# Patient Record
Sex: FEMALE | Race: WHITE | NOT HISPANIC OR LATINO | Employment: FULL TIME | URBAN - METROPOLITAN AREA
[De-identification: names, ages, dates, MRNs, and addresses within clinical notes are randomized per-mention and may not be internally consistent; named-entity substitution may affect disease eponyms.]

---

## 2024-05-06 ENCOUNTER — OFFICE VISIT (OUTPATIENT)
Dept: OBSTETRICS AND GYNECOLOGY | Facility: CLINIC | Age: 34
End: 2024-05-06
Payer: COMMERCIAL

## 2024-05-06 VITALS
HEIGHT: 65 IN | WEIGHT: 131.7 LBS | SYSTOLIC BLOOD PRESSURE: 114 MMHG | BODY MASS INDEX: 21.94 KG/M2 | OXYGEN SATURATION: 98 % | HEART RATE: 76 BPM | DIASTOLIC BLOOD PRESSURE: 72 MMHG

## 2024-05-06 DIAGNOSIS — Z90.710 HISTORY OF HYSTERECTOMY FOR BENIGN DISEASE: ICD-10-CM

## 2024-05-06 DIAGNOSIS — N39.41 SENSORY URGE INCONTINENCE: ICD-10-CM

## 2024-05-06 DIAGNOSIS — R33.9 URINARY RETENTION: ICD-10-CM

## 2024-05-06 DIAGNOSIS — Z01.419 WELL WOMAN EXAM WITH ROUTINE GYNECOLOGICAL EXAM: Primary | ICD-10-CM

## 2024-05-06 PROCEDURE — 99395 PREV VISIT EST AGE 18-39: CPT | Performed by: NURSE PRACTITIONER

## 2024-05-06 RX ORDER — FLUOXETINE HYDROCHLORIDE 20 MG/1
20 CAPSULE ORAL DAILY
COMMUNITY

## 2024-05-06 NOTE — PROGRESS NOTES
HPI: Mary De La Fuente is a 34 y.o. year old  presenting for annual gyn exam    Past pt of Dr. aGle, seen for PMH of endometriosis and pelvic myalgia. She is s/p TLH/BS, benign pathology    Current concerns: having difficulty sensing when having to void, until very full and then has urgency and possibly incontinence. Then will have to sit for 5-10min on toilet just to empty. Sometimes won't be able to void and often doesn't empty completely.    Pt has 6th grader daughter, works as  at [employer].    LMP:  No LMP recorded. Patient has had a hysterectomy.  Sexually active: yes  STI concerns: no  Contraception: hysterectomy   Last mammogram: never  Last pap: 2021 normal, negative HPV  History of abnormal pap: yes, cervical cancer cells, had LEEP age 14yo at that time, then again in her 20's; paps and HPV always normal since 20s  Other gyn history: see above, SAB x1, C/s x1   OB History        2    Para   1    Term                AB   1    Living   1       SAB   1    IAB        Ectopic        Multiple        Live Births   1              Past Medical History:  2019: Personal history of other diseases of the female genital   tract      Comment:  History of abnormal uterine bleeding   Past Surgical History:  2019: OTHER SURGICAL HISTORY      Comment:  Laparoscopy  2022: OTHER SURGICAL HISTORY      Comment:  Laparoscopic hysterectomy   Social History    Socioeconomic History      Marital status: Single      Spouse name: Not on file      Number of children: Not on file      Years of education: Not on file      Highest education level: Not on file    Occupational History      Not on file    Tobacco Use      Smoking status: Some Days        Types: Cigarettes      Smokeless tobacco: Never    Substance and Sexual Activity      Alcohol use: Yes        Alcohol/week: 1.0 standard drink of alcohol        Types: 1 Standard drinks or equivalent per week      Drug use: Never      Sexual  "activity: Not on file    Other Topics      Concerns:        Not on file    Social History Narrative      Not on file    Social Determinants of Health  Financial Resource Strain: Not on file  Food Insecurity: Not on file  Transportation Needs: Not on file  Physical Activity: Not on file  Stress: Not on file  Social Connections: Not on file  Intimate Partner Violence: Not on file  Housing Stability: Not on file   Review of patient's family history indicates:  Problem: Mitral valve prolapse      Relation: Mother          Name:               Age of Onset: (Not Specified)  Problem: Rheum arthritis      Relation: Mother          Name:               Age of Onset: (Not Specified)  Problem: Osteoporosis      Relation: Mother          Name:               Age of Onset: (Not Specified)  Problem: Diabetes      Relation: Father          Name:               Age of Onset: (Not Specified)  Problem: Skin cancer      Relation: Brother          Name:               Age of Onset: (Not Specified)  Problem: Bone cancer      Relation: Maternal Grandmother          Name:               Age of Onset: (Not Specified)  Problem: Skin cancer      Relation: Maternal Grandfather          Name:               Age of Onset: (Not Specified)  Problem: Brain Aneurysm      Relation: Paternal Grandfather          Name:               Age of Onset: (Not Specified)     Current Outpatient Medications:   FLUoxetine (PROzac) 20 mg capsule, Take 1 capsule (20 mg) by mouth once daily., Disp: , Rfl:           REVIEW OF SYSTEMS:  Breast. denies masses, nipple discharge  : denies dysuria, hematuria; rare urge incontinence and decreased sensation of urge to go, having to sit and wait for emptying at time.s  Gl: denies change in bowel habits, blood in stools      PHYSICAL EXAM:  Vitals: /72   Pulse 76   Ht 1.651 m (5' 5\")   Wt 59.7 kg (131 lb 11.2 oz)   SpO2 98%   BMI 21.92 kg/m²   Gen: well appearing woman in NAD  HEENT: normocephalic, thyroid not " enlarged, no lymphadenopathy  CV: no m/r/g  Chest: CTAB, no w/r/r  Breast: normal tissue bilaterally without masses, skin changes, lymphadenopathy   Abdomen: soft, nontender, no masses/hernias, liver and spleen not enlarged   Pelvic:  - external genitalia: normal  - vagina: normal, normal discharge  - cervix: surgically absent  - uterus: surgically absent  - adnexa: no palpable masses or tenderness  RECTAL: no external hemorrhoids; rectal exam deferred    ASSESSMENT/PLAN :    1) Health maintenance, Well-woman exam.  Pap/HPV up to date, and no longer needed d/t benign path  Mammograms yearly starting age 37yo  Nutrition, exercise and routine health maintenance exams reviewed.  Calcium/Vitamin D supplementation information provided   Smoking cessation: at work only, social  2) Contraception: hysterectomy.   3) STD screening: declines, no concerns  4) Urine sensation decreased, possible retention and urge incontinence at times. Refer to UroGyn  Follow up one year or sooner as needed

## 2024-05-13 ENCOUNTER — OFFICE VISIT (OUTPATIENT)
Dept: OBSTETRICS AND GYNECOLOGY | Facility: CLINIC | Age: 34
End: 2024-05-13
Payer: COMMERCIAL

## 2024-05-13 VITALS
DIASTOLIC BLOOD PRESSURE: 68 MMHG | HEIGHT: 65 IN | BODY MASS INDEX: 21.66 KG/M2 | SYSTOLIC BLOOD PRESSURE: 118 MMHG | WEIGHT: 130 LBS

## 2024-05-13 DIAGNOSIS — K59.04 CHRONIC IDIOPATHIC CONSTIPATION: Primary | ICD-10-CM

## 2024-05-13 DIAGNOSIS — N39.41 SENSORY URGE INCONTINENCE: ICD-10-CM

## 2024-05-13 DIAGNOSIS — R33.9 URINARY RETENTION: ICD-10-CM

## 2024-05-13 DIAGNOSIS — R33.9 INCOMPLETE BLADDER EMPTYING: ICD-10-CM

## 2024-05-13 DIAGNOSIS — Z90.710 HISTORY OF HYSTERECTOMY FOR BENIGN DISEASE: ICD-10-CM

## 2024-05-13 PROCEDURE — 99214 OFFICE O/P EST MOD 30 MIN: CPT | Performed by: NURSE PRACTITIONER

## 2024-05-13 NOTE — PROGRESS NOTES
Mary De La Fuente is a 34 y.o. referred by Nichole Lee for suspected incomplete emptying.     Chief Complaint: difficulty emptying bladder,  severe cramping with full bladder (upon waking), AM frequency, stress incontinence on full bladder or if she does not fully empty    OB/GYN History:   - , c/s x 1  - GYN surgeries: hyst  for endo, adenomyosis  - Sexually active with male partner, no pain with intercourse    Prolapse symptoms:   - reports prolapse symptoms, feels like sitting on something but this is a rare sensation  - reports sense of incomplete emptying, she will spend extra few minutes on toilet to get all urine out    Urinary symptoms:   - CITLALI: present and daily, wears pads on occasion for urine loss  - UUI: present but minimally bothersome  - Frequency most bothersome in the morning  - Nocturia: denies  - 0 culture proven UTIs in past year   - Excessive fluid intake Yes - water and soda, upwards 1-2 gallons/day. 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    Bowels  - BMs daily, reports mostly constipation with sense of incomplete bowel emptying    Health Maintenance  - Mammogram: never  - Colonoscopy: never    Past medical and surgical hx reviewed     Physical Exam  Physical Exam  Constitutional:       Appearance: Normal appearance.   Genitourinary:      Vulva, bladder, rectum and urethral meatus normal.      Genitourinary Comments: Pelvic floor grossly tender to palpation L>R      No vaginal discharge.      No vaginal prolapse present.     No vaginal atrophy present.       Right Adnexa: not tender, not full and no mass present.     Left Adnexa: not tender, not full and no mass present.     Uterus is not enlarged, fixed or tender.      Levator ani is tender.   Breasts:     Right: Normal.      Left: Normal.   Pulmonary:      Effort: Pulmonary effort is normal.   Neurological:      General: No focal deficit present.      Mental Status: She is alert and oriented to person, place, and time.    Psychiatric:         Mood and Affect: Mood normal.         Behavior: Behavior normal.         Thought Content: Thought content normal.         Judgment: Judgment normal.   Vitals reviewed.     PVR ** ml by **    Assessment and Plan    Mary De La Fuente is an 34 y.o. being treated for incomplete emptying, stress incontinence, constipation and pelvic floor dysfunction.    Incomplete emptying wih valsalva voiding, pelvic floor dysfunction  PVR 15 ml by ultrasound  Given her symptoms of cramping with full bladder and valsalva voiding/ extended time to void, I do believe this is pelvic floor in origin with difficulty relaxing pelvic floor muscles  She has significant surgical and endo history which could contribute to pelvic floor dysfunction  She has gone to PFPT in the past, but this was immediately pre-COVID, so she was not able to continue in therapy  She is willing to return to PFPT and accepted referral and list of providers today  CITLALI  Present and daily occurrence  Discussed options for treatment including: PFPT, pessary and surgical procedures  She will proceed with PFPT as above  Constipation  Discussed correlation between constipation and bladder symptoms   Recommended starting daily fiber supplement x 2 weeks, if stool is still difficult to pass pt instructed to start miralax once daily in addition to the fiber  Start fiber then miralax if fiber is insuffienct     Follow up 3-4 months after beginning PFPT

## 2024-06-04 NOTE — PROGRESS NOTES
PELVIC FLOOR PHYSICAL THERAPY EVALUATION AND TREATMENT    Patient Name: Mary De La Fuente  MRN: [id number]  Today's Date: 6/5/2024  Time Calculation  Start Time: 1440  Stop Time: 1539  Time Calculation (min): 59 min    INSURANCE:  Visit number: 1  Payor: LUCRETIA / Plan: LUCRETIA HMP / Product Type: *No Product type* /   EVAL $261.00 //**PT EVAL AUTH 6/5-6/19/** 20V PCY 0 USED NEEDS AUTH REMINGTON PAYS AT 80% AFTER DED   Referred by: Mago Mojica A*     PT Evaluation Time Entry  PT Evaluation (Low) Time Entry: 30  PT Therapeutic Procedures Time Entry  Therapeutic Activity Time Entry: 24              Non-Billable Time  Non-billable time: 5  Non-billable time reason: Pt changing clothes      ASSESSMENT:  Mary De La Fuente  is a 34 y.o. old patient who participated in a pelvic floor physical therapy evaluation today due to difficulty emptying bladder, pelvic pain, and urinary leakage. Pt presents with signs and symptoms consistent with overactive pelvic floor and stress urinary incontinence (CITLALI). Her impairments include: reduced bladder sensation, hesitancy, slow stream, straining to void, feeling of incomplete emptying, urinary retention, and difficulty relaxing pelvic floor muscles. Due to these impairments, she has the following functional limitations and participation restrictions: wearing desired clothing, sexual activities, and increased risk of developing UTIs. Mary De La Fuente 's clinical presentation is stable and/or uncomplicated characteristics with the level of complexity being low.  her potential for rehab is good. Skilled physical therapy services are appropriate and beneficial in order to achieve measurable and meaningful change in stated objective tests and measures. Utilization of skilled physical therapy services will aid in advancing her functional status and attaining her therapy-related goals. The patient verbalized understanding and is in agreement with all goals and plan of care. Plan of care was developed  "with input and agreement by the patient    PT Assessment Results: Decreased strength, Pain  Rehab Prognosis: Good    PLAN: [place]: [facility] for pelvic floor down training & objective data; assess response to dilators  Treatment/Interventions: Biofeedback, Cryotherapy, Dry needling, Education/ Instruction, Electrical stimulation, Gait training, Hot pack, Manual therapy, Neuromuscular re-education, Self care/ home management, Taping techniques, Therapeutic activities, Therapeutic exercises, Ultrasound, Work conditioning  PT Plan: Skilled PT  PT Frequency:  (Every other week)  Duration: 10 weeks (5V)  Rehab Potential: Good  Plan of Care Agreement: Patient        THERAPY DIAGNOSIS:  1. Pelvic pain        2. Sensory urge incontinence  Referral to Physical Therapy    Follow Up In Physical Therapy      3. Incomplete bladder emptying  Referral to Physical Therapy    Follow Up In Physical Therapy      4. Pelvic floor tension            SUBJECTIVE:  Pelvic History  Chief Issue/Description of Symptoms: difficulty emptying bladder, severe cramping with full bladder, increased urinary frequency in AM, and urinary leakage with stairs/coughing/laughing/sneezing. Began post-hysterectomy in 2021. Reports using restroom every 20-30 mins at home despite not having urge sensation- attributes this to feeling more comfortable in environment.   Onset of Symptoms: December 17, 2021  Functional limitations: wearing desired clothing & increased risk of developing UTIs  Home Environment: Lives with mom, dad, and daughter in single-level home  Occupation: Full time -   Hobbies: watching TV    Pain location: bladder  Pain description: bloating & cramping sensations  Pain rating: 3/10 current; 7-8/10 at worst  Sx worse with: full bladder & stress  Sx better with: heating pad  Prior treatments/interventions: none since hysterectomy; participated in PFPT prior to hysterectomy  Pt stated goals: \"reduce pain\"    Pelvic " Pain  Description: bloating & cramping  Location: bladder  Duration: constant  Frequency: daily  Since onset, the symptoms are: same  Pain when emptying bladder: yes  Pain with wiping or tight clothing: no  Pain with intercourse: yes  History of back pain: yes    Bladder  Excessive Urinary Urgency: yes  Daytime Voiding Frequency: 3-4x  Nighttime Voiding Frequency: 0x  Unintentional urine loss frequency: daily  Leakage occurs with: coughing, laughing, sneezing, stairs   Leakage amount: small-medium  Difficulty initiating flow of urine: yes  Slow/weak urine stream: yes  Difficulty starting urine stream/push to urinate: yes  Able to completely empty bladder: no  Tests performed by doctor: none  Frequent UTI's: yes    Bowel Screen  BM frequency: 1x daily  Frequent diarrhea: no  Frequent constipation/straining/incomplete emptying: no  Excessive Bowel Urgency: no    Exercise  Current exercise regime: active at job & playing with daughter  Do you do Kegels? yes       MEDICAL HISTORY FORM:  Reviewed (scanned into chart)  Denies unexpected weight loss/gain, night sweats, or night pain      PRECAUTIONS:  Fall risk: none   (+) hx of endometriosis, adenomyosis, PCOS, HPV, UTIs, kidney stones  Denies CA, pacemaker, DM, HTN, blood thinner medication use, latex allergy, epilepsy/seizures, or other known cardio/neuro/pulmonary problems   Previous surgeries include: hysterectomy 2021      OBJECTIVE  *All internal examination performed with informed consent*    Voluntary Pelvic Floor Contraction  weak    Voluntary Pelvic Floor Relaxation  partial    Pelvic Floor MMT Grade  2/poor: squeeze pressure asymmetrical or felt at various points- no lift or displacement    L[place]cock PERF(Power/Endurance/Repetitions/Fast Twitch)  2/4/5/2    Pain: TTP throughout pelvic floor muscle layers 2 & 3 (R>L side); pain with insertion    Pelvic Dermatome Screen: intact    LE MMT: L and R  Hip flex: 4+/5 and 4+/5  Hip ext: NT  Hip ABD: 4/5 and 4/5  Hip ADD:  "4/5 and 4/5  Knee ext: 4+/5 and 4+/5  Knee flex: 4+/5 and 4+/5  Ankle DF: 5/5 and 5/5  Ankle PF: 5/5 and 5/5      OUTCOME MEASURES  Other Measures  Other Outcome Measures: Female NIH-CPSI: 27      TREATMENTS:  Therapeutic Activity x 24 minutes, 1 unit   Education provided on the following concepts  Different dilator brands/types and associated benefits   Juliana Daniels: vibration to promote muscle relaxation  Intimate carlin: excellent brand but jump from size 1 to size 2 is quite large  V well: cheaper option  Reviewed guide to using dilators ( handout provided)  OHNUT: reduce pain with penetration during intercourse  Education on pelvic floor hypertonicity vs true muscle weakness using \"the spring analogy\". Tightly coiled spring has difficulty absorbing shock or meek further upon attempt. If pelvic floor muscles have excessive tension, they are unable to perform appropriate contraction. This may result in a low pelvic floor MMT grade despite no true strength deficit. In this instance, pelvic floor muscles must be down-trained to relax. Once pelvic floor muscles achieve the appropriate tone, true MMT grade can be determined.   Pelvic floor hypertonicity role in causing difficulty relaxing muscles to urinate, pain with intercourse & medical exams, excessive pressure around urethra causing leakage  Instruction to stop doing reps of kegels: will make pelvic floor hypertonicity worse      EDUCATION:  See above        GOALS:   STG (to be achieved in 3 weeks)  Mary De La Fuente will independently perform HEP as assigned to promote self-management of symptoms and continue making functional gains outside of physical therapy.     LTG (to be achieved by discharge)  Mary De La Fuente will decrease NIH-CPSI raw score by at least 6 points (MCID) to demonstrate improved quality of life.  Mary De La Fuente will report at least 50% reduction in pelvic pain with intercourse.   Mary De La Fuente will report minimal to no leakage with coughing, " laughing, sneezing, or stairs.   Mary De La Fuente will improve resting tone of pelvic floor to within norm of 2-3 microvolts to assist with emptying bladder and controlling pelvic pain.

## 2024-06-05 ENCOUNTER — EVALUATION (OUTPATIENT)
Dept: PHYSICAL THERAPY | Facility: CLINIC | Age: 34
End: 2024-06-05
Payer: COMMERCIAL

## 2024-06-05 DIAGNOSIS — R10.2 PELVIC PAIN: Primary | ICD-10-CM

## 2024-06-05 DIAGNOSIS — M62.89 PELVIC FLOOR TENSION: ICD-10-CM

## 2024-06-05 DIAGNOSIS — N39.41 SENSORY URGE INCONTINENCE: ICD-10-CM

## 2024-06-05 DIAGNOSIS — R33.9 INCOMPLETE BLADDER EMPTYING: ICD-10-CM

## 2024-06-05 PROCEDURE — 97161 PT EVAL LOW COMPLEX 20 MIN: CPT | Mod: GP

## 2024-06-05 PROCEDURE — 97530 THERAPEUTIC ACTIVITIES: CPT | Mod: GP

## 2024-06-05 SDOH — ECONOMIC STABILITY: FOOD INSECURITY: WITHIN THE PAST 12 MONTHS, THE FOOD YOU BOUGHT JUST DIDN'T LAST AND YOU DIDN'T HAVE MONEY TO GET MORE.: NEVER TRUE

## 2024-06-05 SDOH — ECONOMIC STABILITY: FOOD INSECURITY: WITHIN THE PAST 12 MONTHS, YOU WORRIED THAT YOUR FOOD WOULD RUN OUT BEFORE YOU GOT MONEY TO BUY MORE.: NEVER TRUE

## 2024-06-05 ASSESSMENT — COLUMBIA-SUICIDE SEVERITY RATING SCALE - C-SSRS
6. HAVE YOU EVER DONE ANYTHING, STARTED TO DO ANYTHING, OR PREPARED TO DO ANYTHING TO END YOUR LIFE?: NO
1. IN THE PAST MONTH, HAVE YOU WISHED YOU WERE DEAD OR WISHED YOU COULD GO TO SLEEP AND NOT WAKE UP?: NO
2. HAVE YOU ACTUALLY HAD ANY THOUGHTS OF KILLING YOURSELF?: NO

## 2024-06-05 ASSESSMENT — ENCOUNTER SYMPTOMS
LOSS OF SENSATION IN FEET: 0
DEPRESSION: 0
OCCASIONAL FEELINGS OF UNSTEADINESS: 0

## 2024-06-05 ASSESSMENT — PATIENT HEALTH QUESTIONNAIRE - PHQ9
2. FEELING DOWN, DEPRESSED OR HOPELESS: NOT AT ALL
SUM OF ALL RESPONSES TO PHQ9 QUESTIONS 1 AND 2: 0
1. LITTLE INTEREST OR PLEASURE IN DOING THINGS: NOT AT ALL

## 2024-06-24 ENCOUNTER — APPOINTMENT (OUTPATIENT)
Dept: PHYSICAL THERAPY | Facility: CLINIC | Age: 34
End: 2024-06-24
Payer: COMMERCIAL

## 2024-06-25 NOTE — PROGRESS NOTES
PELVIC FLOOR PHYSICAL THERAPY TREATMENT NOTE    Patient Name:  Mary De La Fuente   Patient MRN: [id number]  Date: 06/27/24  Time Calculation  Start Time: 0918  Stop Time: 1005  Time Calculation (min): 47 min    Insurance:  Visit number: 2 (updated 6/27/2024)   Payor: LUCRETIA / Plan: [employer]CR[employer]IA HMP / Product Type: *No Product type* /   EVAL $261.00 //**PT EVAL AUTH 6/5-6/19/** 20V PCY 0 USED NEEDS AUTH REMINGTON PAYS AT 80% AFTER DED   Referred by: Mago Mojica A*        PT Therapeutic Procedures Time Entry  Neuromuscular Re-Education Time Entry: 34  Therapeutic Activity Time Entry: 10              Non-Billable Time  Non-billable time: 3  Non-billable time reason: Pt changing clothes      Assessment:  Progress towards functional goals: able to tolerate/progress vaginal dilator size for internal stretching  Response to interventions: Patient left session with all questions answered and no increase in sx. Patient verbalized improved understanding of alternating internal vs external pelvic floor stretching.   Justification for continued skilled care: reduce pelvic pain; promote pelvic floor relaxation to assist with emptying bladder  Collected objective data using Presentigo machine with informed pt consent. Baseline pelvic floor resting tone elevated (4.8 microvolts) vs norm (2-3 microvolts). Pt agreeable to use of ESTIM for pelvic floor down-training and educated that pain may temporarily increase post-performance, verbalized understanding. Reviewed contraindications of ESTIM prior to use: Mary De La Fuente denied pacemaker, arrhythmias, implanted stimulators, thrombophlebitis, pregnancy (if applicable), or malignant tumor. Also reviewed precautions: she denied/reported cardiac disease, impaired sensation, skin irritation/wounds. Instructed pt through diaphragmatic breathing to assist with reducing pelvic floor tone. Post-assessment revealed elevated resting tone compared to baseline, pt denied pain. Instructed pt to monitor sx  "and report back next appointment. Reduced dilator use to every other day alternating with external pelvic floor stretching due to subjective report of increased cramping post-performance.         Plan:  pelvic floor down-training    Therapy diagnoses:   1. Pelvic pain        2. Sensory urge incontinence  Follow Up In Physical Therapy      3. Incomplete bladder emptying  Follow Up In Physical Therapy      4. Pelvic floor tension             Subjective:  Mary reports she feels like her condition is neither improving nor worsening. Purchased dilators: initially unable to fully insert size 1, however, after 2 weeks she is now able. Consistently experiences abdominal cramping after dilator use. No change in pain or bladder so far.   Pain Location: bladder  Pain (0-10): 4   HEP adherence / understanding: compliance with the instructed home exercises.    Precautions:  Fall risk: none   (+) hx of endometriosis, adenomyosis, PCOS, HPV, UTIs, kidney stones  Denies CA, pacemaker, DM, HTN, blood thinner medication use, latex allergy, epilepsy/seizures, or other known cardio/neuro/pulmonary problems   Previous surgeries include: hysterectomy 2021      Objective:  Avg resting tone pre-ESTIM: 4.8 microvolts  Avg resting tone post-ESTIM: 5.8 mircovolts      Treatment Performed: (\"NP\" = Not Performed)   Therapeutic Activities:  Instruction to reduce dilator use to every other day due to abdominal cramping  Verbal review of assigned HEP for external PF stretching (to be performed on alternate days of dilator)  Assembla access code [id number]  Supine Pelvic Floor Stretch  - 1 x daily - 7 x weekly - 1 sets - 3-4 reps - 30 sec hold  Deep Squat with Pelvic Floor Relaxation  - 1 x daily - 7 x weekly - 1 sets - 3-4 reps - 30 sec hold  Diaphragmatic Breathing in Supported Child's Pose with Pelvic Floor Relaxation  - 1 x daily - 7 x weekly - 1 sets - 1 reps - 2-5 min hold  Neuromuscular Re-Education:   Pre-ESTIM: baseline assessment for avg " resting tone (see objective)  ESTIM for pelvic floor down-training: verbal instruction for diaphragmatic breathing EC  Prometheus internal rectal sensor used with pt in hooklying position  Selected parameters: 12.5 Hz, 5 sec on: 5 sec off 15 min  Intensity 32  Post-ESTIM: assessment for avg resting tone and min resting tone (see objective)      NP this session  Therapeutic Exercise:  Gait Training:  Manual Therapy:  Modalities:       Education: Answered questions about home exercise program. Provided verbal feedback to improve exercise technique

## 2024-06-27 ENCOUNTER — TREATMENT (OUTPATIENT)
Dept: PHYSICAL THERAPY | Facility: CLINIC | Age: 34
End: 2024-06-27
Payer: COMMERCIAL

## 2024-06-27 DIAGNOSIS — N39.41 SENSORY URGE INCONTINENCE: ICD-10-CM

## 2024-06-27 DIAGNOSIS — R10.2 PELVIC PAIN: Primary | ICD-10-CM

## 2024-06-27 DIAGNOSIS — M62.89 PELVIC FLOOR TENSION: ICD-10-CM

## 2024-06-27 DIAGNOSIS — R33.9 INCOMPLETE BLADDER EMPTYING: ICD-10-CM

## 2024-06-27 PROCEDURE — 97112 NEUROMUSCULAR REEDUCATION: CPT | Mod: GP

## 2024-06-27 PROCEDURE — 97530 THERAPEUTIC ACTIVITIES: CPT | Mod: GP

## 2024-07-08 NOTE — PROGRESS NOTES
PELVIC FLOOR PHYSICAL THERAPY TREATMENT NOTE    Patient Name:  Mary De La Fuente   Patient MRN: [id number]  Date: 07/08/24       Insurance:  Visit number: Visit count could not be calculated. Make sure you are using a visit which is associated with an episode. (updated 7/9/2024)   Payor: [employer] / Plan: [employer] [facility] / Product Type: *No Product type* /   EVAL $261.00 //**PT EVAL AUTH 6/5-6/19/** 20V PCY 0 USED NEEDS AUTH REMINGTON PAYS AT 80% AFTER DED   Referred by: Mago Mojica A*                              Assessment:  Progress towards functional goals: able to tolerate/progress vaginal dilator size for internal stretching  Response to interventions: Patient left session with all questions answered and no increase in sx. Patient verbalized improved understanding of alternating internal vs external pelvic floor stretching.   Justification for continued skilled care: reduce pelvic pain; promote pelvic floor relaxation to assist with emptying bladder  Collected objective data using magnetic.io machine with informed pt consent. Baseline pelvic floor resting tone elevated (4.8 microvolts) vs norm (2-3 microvolts). Pt agreeable to use of ESTIM for pelvic floor down-training and educated that pain may temporarily increase post-performance, verbalized understanding. Reviewed contraindications of ESTIM prior to use: Mary De La Fuente denied pacemaker, arrhythmias, implanted stimulators, thrombophlebitis, pregnancy (if applicable), or malignant tumor. Also reviewed precautions: she denied/reported cardiac disease, impaired sensation, skin irritation/wounds. Instructed pt through diaphragmatic breathing to assist with reducing pelvic floor tone. Post-assessment revealed elevated resting tone compared to baseline, pt denied pain. Instructed pt to monitor sx and report back next appointment. Reduced dilator use to every other day alternating with external pelvic floor stretching due to subjective report of increased cramping  "post-performance.         Plan:  pelvic floor down-training    Therapy diagnoses:   No diagnosis found.       Subjective:  Mary reports she feels like her condition is neither improving nor worsening. Purchased dilators: initially unable to fully insert size 1, however, after 2 weeks she is now able. Consistently experiences abdominal cramping after dilator use. No change in pain or bladder so far.   Pain Location: bladder  Pain (0-10): 4   HEP adherence / understanding: compliance with the instructed home exercises.    Precautions:  Fall risk: none   (+) hx of endometriosis, adenomyosis, PCOS, HPV, UTIs, kidney stones  Denies CA, pacemaker, DM, HTN, blood thinner medication use, latex allergy, epilepsy/seizures, or other known cardio/neuro/pulmonary problems   Previous surgeries include: hysterectomy 2021      Objective:  Avg resting tone pre-ESTIM: 4.8 microvolts  Avg resting tone post-ESTIM: 5.8 mircovolts      Treatment Performed: (\"NP\" = Not Performed)   Therapeutic Activities:  Instruction to reduce dilator use to every other day due to abdominal cramping  Verbal review of assigned HEP for external PF stretching (to be performed on alternate days of dilator)  [employer] access code [email or web address]  Supine Pelvic Floor Stretch  - 1 x daily - 7 x weekly - 1 sets - 3-4 reps - 30 sec hold  Deep Squat with Pelvic Floor Relaxation  - 1 x daily - 7 x weekly - 1 sets - 3-4 reps - 30 sec hold  Diaphragmatic Breathing in Supported Child's Pose with Pelvic Floor Relaxation  - 1 x daily - 7 x weekly - 1 sets - 1 reps - 2-5 min hold  Neuromuscular Re-Education:   Pre-ESTIM: baseline assessment for avg resting tone (see objective)  ESTIM for pelvic floor down-training: verbal instruction for diaphragmatic breathing EC  Prometheus internal rectal sensor used with pt in hooklying position  Selected parameters: 12.5 Hz, 5 sec on: 5 sec off 15 min  Intensity 32  Post-ESTIM: assessment for avg resting tone and min resting tone (see " objective)      NP this session  Therapeutic Exercise:  Gait Training:  Manual Therapy:  Modalities:       Education: Answered questions about home exercise program. Provided verbal feedback to improve exercise technique

## 2024-07-09 ENCOUNTER — APPOINTMENT (OUTPATIENT)
Dept: PHYSICAL THERAPY | Facility: CLINIC | Age: 34
End: 2024-07-09
Payer: COMMERCIAL

## 2024-07-09 ENCOUNTER — DOCUMENTATION (OUTPATIENT)
Dept: PHYSICAL THERAPY | Facility: CLINIC | Age: 34
End: 2024-07-09
Payer: COMMERCIAL

## 2024-07-09 NOTE — PROGRESS NOTES
Physical Therapy                 Therapy Communication Note    Patient Name: Mary De La Fuente  MRN: [id number]  Today's Date: 7/9/2024     Discipline: Physical Therapy    Comment: Patient cx via MyChart

## 2024-07-10 NOTE — PROGRESS NOTES
PELVIC FLOOR PHYSICAL THERAPY TREATMENT NOTE    Patient Name:  Mary De La Fuente   Patient MRN: [id number]  Date: 07/11/24  Time Calculation  Start Time: 1045  Stop Time: 1130  Time Calculation (min): 45 min    Insurance:  Visit number: 3 (updated 7/11/2024)   Payor: [employer] / Plan: [employer] [facility] / Product Type: *No Product type* /   EVAL $261.00 //**PT EVAL AUTH 6/5-6/19/** 20V PCY 0 USED NEEDS AUTH MIGUEL [employer]NGELLOTIFFANY PAYS AT 80% AFTER DED   Referred by: Mago Mojica A*        PT Therapeutic Procedures Time Entry  Neuromuscular Re-Education Time Entry: 39  Therapeutic Activity Time Entry: 6                     Assessment:  Progress towards functional goals: able to tolerate/progress vaginal dilator size for internal stretching & reporting more consecutive void durations  Response to interventions: Patient left session with all questions answered and no increase in sx.   Justification for continued skilled care: reduce pelvic pain; promote pelvic floor relaxation to assist with emptying bladder  Pt agreeable to using [employer] machine to assess baseline resting tone of pelvic floor musculature: WNL indicating successful carryover of internal and external pelvic floor stretching. Functionally, pt has been able to void for longer durations vs multiple voids. Pt selected option of ESTIM for pelvic floor down-training- reports discomfort was mild following last session and she only works a half day today. Similar to last session, post-assessment revealed mildly elevated resting tone compared to baseline- pt denied pain. Instructed pt to monitor sx. Additional education provided on bladder emptying techniques as described below-will monitor effects.       Plan:  pelvic floor down-training    Therapy diagnoses:   1. Pelvic pain        2. Sensory urge incontinence  Follow Up In Physical Therapy      3. Incomplete bladder emptying  Follow Up In Physical Therapy      4. Pelvic floor tension               Subjective:  Mary reports she  "feels like her condition is neither improving nor worsening. Mild increase in cramping sensations following last session: attributes to combination of ESTIM down-training and standing for several hours at work. Positively, notes decreased sx with dilator use and longer void durations (unsure how many seconds).   Pain Location: bladder  Pain (0-10): 3   HEP adherence / understanding: compliance with the instructed home exercises.    Precautions:  Fall risk: none   (+) hx of endometriosis, adenomyosis, PCOS, HPV, UTIs, kidney stones  Denies CA, pacemaker, DM, HTN, blood thinner medication use, latex allergy, epilepsy/seizures, or other known cardio/neuro/pulmonary problems   Previous surgeries include: hysterectomy 2021      Objective:  Avg resting tone pre-ESTIM: 2.9 microvolts  Avg resting tone post-ESTIM: 3.3 microvolts      Treatment Performed: (\"NP\" = Not Performed)     Therapeutic Activities:  Education on Bladder Emptying Techniques   - Tapping over bladder  - Stroking low back  - Warm cloth over abdomen  - Timed voids every 2-3 hours  - Leaning forward  - Blowing pinwheel/exhale  - Double void    Neuromuscular Re-Education:   Pre-ESTIM: baseline assessment for avg resting tone (see objective)  ESTIM for pelvic floor down-training: verbal instruction for diaphragmatic breathing EC  Prometheus internal rectal sensor used with pt in hooklying position  Selected parameters: 12.5 Hz, 5 sec on: 5 sec off 15 min  Intensity 37  Post-ESTIM: assessment for avg resting tone and min resting tone (see objective)      NP this session  Therapeutic Exercise:  Gait Training:  Manual Therapy:  Modalities:       Education: Answered questions about home exercise program. Provided verbal feedback to improve exercise technique  "

## 2024-07-11 ENCOUNTER — TREATMENT (OUTPATIENT)
Dept: PHYSICAL THERAPY | Facility: CLINIC | Age: 34
End: 2024-07-11
Payer: COMMERCIAL

## 2024-07-11 DIAGNOSIS — R10.2 PELVIC PAIN: Primary | ICD-10-CM

## 2024-07-11 DIAGNOSIS — N39.41 SENSORY URGE INCONTINENCE: ICD-10-CM

## 2024-07-11 DIAGNOSIS — R33.9 INCOMPLETE BLADDER EMPTYING: ICD-10-CM

## 2024-07-11 DIAGNOSIS — M62.89 PELVIC FLOOR TENSION: ICD-10-CM

## 2024-07-11 PROCEDURE — 97112 NEUROMUSCULAR REEDUCATION: CPT | Mod: GP

## 2024-07-30 ENCOUNTER — APPOINTMENT (OUTPATIENT)
Dept: PHYSICAL THERAPY | Facility: CLINIC | Age: 34
End: 2024-07-30
Payer: COMMERCIAL

## 2024-08-12 NOTE — PROGRESS NOTES
PELVIC FLOOR PHYSICAL THERAPY TREATMENT NOTE    Patient Name:  Mary De La Fuente   Patient MRN: [id number]  Date: 08/13/24  Time Calculation  Start Time: 1535  Stop Time: 1615  Time Calculation (min): 40 min    Insurance:  Visit number: 4 (updated 8/13/2024)   Payor: [employer] / Plan: [employer] [facility] / Product Type: *No Product type* /   EVAL $261.00 //**PT EVAL AUTH 6/5-6/19/** 20V PCY 0 USED NEEDS AUTH REMINGTON PAYS AT 80% AFTER DED   Referred by: Mago Mojica A*        PT Therapeutic Procedures Time Entry  Therapeutic Exercise Time Entry: 40                     Assessment:  Improved bladder emptying per subjective report: consistent stream of urine and less frequent episodes of void time < 8 seconds. Discontinued ESTIM due to subjective report of abdominal cramping the next day. Pt agreeable to Prom[employer] machine use without ESTIM to track pelvic floor resting tone in future. Pelvic floor hypertonicity improving per subjective report: now able to fully insert size 1 dilator, plans on progressing to next size this week. Suspect that stress is contributing to pelvic floor hypertonicity: pt notes easier insertion of dilator in AM vs PM after work. Continued to progress external stretching of pelvic floor and accessory muscles this session. Updated HEP to include abdominal vs pelvic floor relaxation techniques with diaphragmatic breathing.     Progress towards functional goals: experiencing less frequent void times of < 8 seconds. Able to fully insert size 1 dilator.   Response to interventions: Patient left session with all questions answered and no increase in sx.   Justification for continued skilled care: reduce pelvic pain; promote pelvic floor relaxation to assist with emptying bladder    Plan:  pelvic floor down-training    Therapy diagnoses:   1. Pelvic pain        2. Sensory urge incontinence  Follow Up In Physical Therapy      3. Incomplete bladder emptying  Follow Up In Physical Therapy      4. Pelvic floor  "tension            Subjective:  Mary reports she feels like her condition is mildly improving. Void times ranging from 3-24 seconds now. Having less episodes of 3 second void times which are painful/burning. Now able to fully insert 1st dilator: finds it easier to perform in morning vs after work. Experienced increased abdominal cramping 1 day after ESTIM use.   Pain Location: bladder  Pain (0-10): 0   HEP adherence / understanding: compliance with the instructed home exercises.    Precautions:  Fall risk: none   (+) hx of endometriosis, adenomyosis, PCOS, HPV, UTIs, kidney stones  Denies CA, pacemaker, DM, HTN, blood thinner medication use, latex allergy, epilepsy/seizures, or other known cardio/neuro/pulmonary problems   Previous surgeries include: hysterectomy 2021      Objective:      Treatment Performed: (\"NP\" = Not Performed)     Therapeutic Exercise:  Access Code: [email or web address]  - Supine Pelvic Floor Stretch - 3 reps - 1 min  - Supine Butterfly Groin Stretch - 1 sets - 1 reps - 2 min hold  - Reclined [place] Pose - 1 sets - 3 reps - 30 sec hold each side  - Deep Squat with Pelvic Floor Relaxation - 1 sets - 3 reps - 30 sec hold  - Diaphragmatic Breathing in Supported Child's Pose with Pelvic Floor Relaxation - 1 reps - 2 min hold  - Hooklying diaphragmatic breathing for pelvic floor relaxation: inhale \"carlin bud petals softly opening\" and exhale \"petals drawing back together\" x3 min  - Hooklying diaphragmatic breathing for lower abdominal relaxation: 3# dumbbells placed on top of lower abdomen: feel abdomen push into weights with inhale x3 min     NP this session  Neuromuscular Re-Education:   Therapeutic Activities:  Gait Training:  Manual Therapy:  Modalities:       Education: Reviewed/updated home exercise program. Provided verbal feedback to improve exercise technique  "

## 2024-08-13 ENCOUNTER — TREATMENT (OUTPATIENT)
Dept: PHYSICAL THERAPY | Facility: CLINIC | Age: 34
End: 2024-08-13
Payer: COMMERCIAL

## 2024-08-13 DIAGNOSIS — R33.9 INCOMPLETE BLADDER EMPTYING: ICD-10-CM

## 2024-08-13 DIAGNOSIS — R10.2 PELVIC PAIN: Primary | ICD-10-CM

## 2024-08-13 DIAGNOSIS — M62.89 PELVIC FLOOR TENSION: ICD-10-CM

## 2024-08-13 DIAGNOSIS — N39.41 SENSORY URGE INCONTINENCE: ICD-10-CM

## 2024-08-13 PROCEDURE — 97110 THERAPEUTIC EXERCISES: CPT | Mod: GP

## 2024-08-21 NOTE — PROGRESS NOTES
"PELVIC FLOOR PHYSICAL THERAPY TREATMENT NOTE    Patient Name:  Mary De La Fuente   Patient MRN: [id number]  Date: 08/21/24       Insurance:  Visit number: Visit count could not be calculated. Make sure you are using a visit which is associated with an episode. (updated 8/27/2024)   Payor: [employer] / Plan: [employer] [facility] / Product Type: *No Product type* /   EVAL $261.00 //**PT EVAL AUTH 6/5-6/19/** 20V PCY 0 USED NEEDS AUTH REMINGTON PAYS AT 80% AFTER DED   Referred by: Mago Mojica A*                              Assessment:  ***  Progress towards functional goals: experiencing less frequent void times of < 8 seconds. Able to fully insert size 1 dilator.   Response to interventions: Patient left session with all questions answered and no increase in sx.   Justification for continued skilled care: reduce pelvic pain; promote pelvic floor relaxation to assist with emptying bladder    Plan:  pelvic floor down-training    Therapy diagnoses:   No diagnosis found.      Subjective:  Mary reports she feels like her condition is mildly improving. Void times ranging from 3-24 seconds now. Having less episodes of 3 second void times which are painful/burning. Now able to fully insert 1st dilator: finds it easier to perform in morning vs after work. Experienced increased abdominal cramping 1 day after ESTIM use.   Pain Location: bladder  Pain (0-10): 0   HEP adherence / understanding: compliance with the instructed home exercises.    Precautions:  Fall risk: none   (+) hx of endometriosis, adenomyosis, PCOS, HPV, UTIs, kidney stones  Denies CA, pacemaker, DM, HTN, blood thinner medication use, latex allergy, epilepsy/seizures, or other known cardio/neuro/pulmonary problems   Previous surgeries include: hysterectomy 2021      Objective:      Treatment Performed: (\"NP\" = Not Performed)   Access Code: [email or web address]    NP this session  Therapeutic Exercise:  Neuromuscular Re-Education:   Therapeutic Activities:  Gait Training:  Manual " Therapy:  Modalities:       Education: Reviewed/updated home exercise program. Provided verbal feedback to improve exercise technique

## 2024-08-26 ENCOUNTER — APPOINTMENT (OUTPATIENT)
Dept: OBSTETRICS AND GYNECOLOGY | Facility: CLINIC | Age: 34
End: 2024-08-26
Payer: COMMERCIAL

## 2024-08-27 ENCOUNTER — APPOINTMENT (OUTPATIENT)
Dept: PHYSICAL THERAPY | Facility: CLINIC | Age: 34
End: 2024-08-27
Payer: COMMERCIAL

## 2024-08-28 NOTE — PROGRESS NOTES
PELVIC FLOOR PHYSICAL THERAPY TREATMENT NOTE    Patient Name:  Mary De La Fuente   Patient MRN: [id number]  Date: 08/29/24  Time Calculation  Start Time: 1005  Stop Time: 1045  Time Calculation (min): 40 min    Insurance:  Visit number: 5 (updated 8/29/2024)   Payor: [employer] / Plan: [employer] [facility] / Product Type: *No Product type* /   EVAL $261.00 //**PT AUTH 12V 6/6-9/3/24*NO E-STIM** 20V PCY 0 USED NEEDS AUTH REMINGTON PAYS AT 80% AFTER DED 2500.00   Referred by: Mago Mojica A*        PT Therapeutic Procedures Time Entry  Manual Therapy Time Entry: 15  Therapeutic Activity Time Entry: 25                     Assessment:  Re-assessment performed today after Mary De La Fuente has attended 5V due to difficulty emptying bladder, pelvic pain, and urinary leakage. Pt presents with signs and symptoms consistent with overactive pelvic floor and stress urinary incontinence (CITLALI).  Treatments have primarily consisted of: TherEx, TherAct, Neuromuscular Re-Education. Mary De La Fuente has demonstrated improvements in reduced cramping/pain with empty bladder since initial evaluation. She reports 80-85% ease with emptying bladder.  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 Good progress made towards established goals. Feel that pt would benefit from dry needling services in future, adding to POC. Skilled physical therapy services continue to be appropriate and beneficial at recommended frequency of 1x every 3 weeks for 4 visits to advance functional status and attain therapy-related goals. Reviewed manual stretching with informed pt consent. Multiple trigger points present/addressed in pelvic floor muscle layers 2-3 (L > R side). Mary De La Fuente left session with all questions answered and no increased symptoms.       Plan:  pelvic floor  down-training    Therapy diagnoses:   1. Pelvic pain  Follow Up In Physical Therapy      2. Sensory urge incontinence  Follow Up In Physical Therapy    Follow Up In Physical Therapy      3. Incomplete bladder emptying  Follow Up In Physical Therapy    Follow Up In Physical Therapy      4. Pelvic floor tension  Follow Up In Physical Therapy            Subjective:  Mary reports she feels like her condition improving. Comfortably progressed to 2nd dilator size. No longer having cramps/pain with urination. Less frequent void times < 8 seconds. Having difficulty recognizing bladder alarms/urges when busy at work, however, is now getting appropriate urges at home.   Pain Location: bladder  Pain (0-10): 0   HEP adherence / understanding: compliance with the instructed home exercises.    Precautions:  Fall risk: none   (+) hx of endometriosis, adenomyosis, PCOS, HPV, UTIs, kidney stones  Denies CA, pacemaker, DM, HTN, blood thinner medication use, latex allergy, epilepsy/seizures, or other known cardio/neuro/pulmonary problems   Previous surgeries include: hysterectomy 2021      Objective:   LE MMT: L and R  Hip flex: 4+/5 and 4+/5  Hip ext: NT  Hip ABD: 4+/5 and 4+/5  Hip ADD: 4+/5 and 4+/5  Knee ext: 5/5 and 5/5  Knee flex: 5/5 and 5/5  Ankle DF: 5/5 and 5/5  Ankle PF: 5/5 and 5/5    Other Outcome Measures: Female NIH-CPSI: 27 points initial eval >> 10 points 8/29/24      GOALS:   STG (to be achieved in 3 weeks)  Mary De La Fuente will independently perform HEP as assigned to promote self-management of symptoms and continue making functional gains outside of physical therapy.  8/29/24: GOAL MET     LTG (to be achieved by discharge)  Mary De La Fuente will decrease NIH-CPSI raw score by at least 6 points (MCID) to demonstrate improved quality of life.  8/29/24: GOAL MET  Mary De La Fuente will report at least 50% reduction in pelvic pain with intercourse.   8/29/24: PROGRESSING- reports continued pain dilator insertion  Mary De La Fuente will  "report minimal to no leakage with coughing, laughing, sneezing, or stairs.   8/29/24: GOAL MET  Mary De La Fuente will improve resting tone of pelvic floor to within norm of 2-3 microvolts to assist with emptying bladder and controlling pelvic pain.   8/29/24: NOT ASSESSED THIS VISIT      Treatment Performed: (\"NP\" = Not Performed)   [employer] [facility] Access Code: [email or web address]    Therapeutic Activities:  Re-assessment: see subjective, objective, assessment, and updated goals     Instruction and education on timed voids every 3-4 hours at work to re-train bladder alarms    Manual Therapy:  Internal stretching performed by therapist with informed pt consent. Performed with pt in hooklying  Negative tenderness throughout layer 1  Multiple trigger points with tenderness in L side pelvic floor layers 2-3 vs minimal trigger points in R side layer 3 only      NP this session  Therapeutic Exercise:  Neuromuscular Re-Education:   Gait Training:  Modalities:       Education: Reviewed/updated home exercise program. Cueing and rationale for all interventions.   "

## 2024-08-29 ENCOUNTER — APPOINTMENT (OUTPATIENT)
Dept: PHYSICAL THERAPY | Facility: CLINIC | Age: 34
End: 2024-08-29
Payer: COMMERCIAL

## 2024-08-29 DIAGNOSIS — M62.89 PELVIC FLOOR TENSION: ICD-10-CM

## 2024-08-29 DIAGNOSIS — R33.9 INCOMPLETE BLADDER EMPTYING: ICD-10-CM

## 2024-08-29 DIAGNOSIS — R10.2 PELVIC PAIN: Primary | ICD-10-CM

## 2024-08-29 DIAGNOSIS — N39.41 SENSORY URGE INCONTINENCE: ICD-10-CM

## 2024-08-29 PROCEDURE — 97140 MANUAL THERAPY 1/> REGIONS: CPT | Mod: GP

## 2024-08-29 PROCEDURE — 97530 THERAPEUTIC ACTIVITIES: CPT | Mod: GP

## 2024-08-30 ENCOUNTER — TELEPHONE (OUTPATIENT)
Dept: OBSTETRICS AND GYNECOLOGY | Facility: CLINIC | Age: 34
End: 2024-08-30

## 2024-12-26 ENCOUNTER — OFFICE VISIT (OUTPATIENT)
Dept: URGENT CARE | Age: 34
End: 2024-12-26
Payer: COMMERCIAL

## 2024-12-26 VITALS
DIASTOLIC BLOOD PRESSURE: 73 MMHG | HEIGHT: 65 IN | RESPIRATION RATE: 16 BRPM | WEIGHT: 120 LBS | SYSTOLIC BLOOD PRESSURE: 115 MMHG | BODY MASS INDEX: 19.99 KG/M2 | HEART RATE: 64 BPM | OXYGEN SATURATION: 98 % | TEMPERATURE: 98.2 F

## 2024-12-26 DIAGNOSIS — J20.9 ACUTE BRONCHITIS, UNSPECIFIED ORGANISM: ICD-10-CM

## 2024-12-26 DIAGNOSIS — J01.00 ACUTE NON-RECURRENT MAXILLARY SINUSITIS: Primary | ICD-10-CM

## 2024-12-26 DIAGNOSIS — H65.01 NON-RECURRENT ACUTE SEROUS OTITIS MEDIA OF RIGHT EAR: ICD-10-CM

## 2024-12-26 RX ORDER — BROMPHENIRAMINE MALEATE, PSEUDOEPHEDRINE HYDROCHLORIDE, AND DEXTROMETHORPHAN HYDROBROMIDE 2; 30; 10 MG/5ML; MG/5ML; MG/5ML
10 SYRUP ORAL 4 TIMES DAILY PRN
Qty: 240 ML | Refills: 0 | Status: SHIPPED | OUTPATIENT
Start: 2024-12-26 | End: 2024-12-31

## 2024-12-26 RX ORDER — AMOXICILLIN AND CLAVULANATE POTASSIUM 875; 125 MG/1; MG/1
875 TABLET, FILM COATED ORAL 2 TIMES DAILY
Qty: 20 TABLET | Refills: 0 | Status: SHIPPED | OUTPATIENT
Start: 2024-12-26

## 2024-12-26 RX ORDER — ALBUTEROL SULFATE 90 UG/1
2 INHALANT RESPIRATORY (INHALATION) EVERY 6 HOURS PRN
Qty: 18 G | Refills: 0 | Status: SHIPPED | OUTPATIENT
Start: 2024-12-26 | End: 2025-12-26

## 2024-12-26 ASSESSMENT — ENCOUNTER SYMPTOMS: COUGH: 1

## 2024-12-26 NOTE — LETTER
December 26, 2024     Patient: Mary De La Fuente   YOB: 1990   Date of Visit: 12/26/2024       To Whom It May Concern:    Mary De La Fuente was seen in my clinic on 12/26/2024 at 10:55 am. Please excuse Mary for her absence from work today and tomorrow.    If you have any questions or concerns, please don't hesitate to call.         Sincerely,         Kelley Hernandez MD        CC: No Recipients

## 2024-12-26 NOTE — PATIENT INSTRUCTIONS
Antibiotics as directed; take with food  Bromfed as needed  Albuterol as directed  Follow up with new or worsening symptoms

## 2024-12-26 NOTE — PROGRESS NOTES
"Subjective   Patient ID: Mary De La Fuente is a 34 y.o. female. They present today with a chief complaint of Cough, Generalized Body Aches, and Other (Hot/cold flashes and runny nose about 2 weeks ).    History of Present Illness  Subjective  Mary De La Fuente is a 34 y.o. female who presents for evaluation of symptoms of a URI. Symptoms include bilateral ear pressure/pain, cough described as productive, fever, and nasal congestion. Onset of symptoms was 2 weeks ago and has been gradually worsening since that time. Treatment to date: cough suppressants.          Cough        Past Medical History  Allergies as of 12/26/2024    (No Known Allergies)       (Not in a hospital admission)       Past Medical History:   Diagnosis Date    Personal history of other diseases of the female genital tract 06/06/2019    History of abnormal uterine bleeding       Past Surgical History:   Procedure Laterality Date    OTHER SURGICAL HISTORY  11/01/2019    Laparoscopy    OTHER SURGICAL HISTORY  01/13/2022    Laparoscopic hysterectomy        reports that she has been smoking cigarettes. She has never used smokeless tobacco. She reports current alcohol use of about 1.0 standard drink of alcohol per week. She reports that she does not use drugs.    Review of Systems  Review of Systems   Respiratory:  Positive for cough.                                   Objective    Vitals:    12/26/24 1116   BP: 115/73   Pulse: 64   Resp: 16   Temp: 36.8 °C (98.2 °F)   TempSrc: Oral   SpO2: 98%   Weight: 54.4 kg (120 lb)   Height: 1.651 m (5' 5\")     No LMP recorded. Patient has had a hysterectomy.    Physical Exam  Constitutional:       General: She is not in acute distress.     Appearance: Normal appearance.   HENT:      Right Ear: Ear canal and external ear normal. A middle ear effusion is present.      Left Ear: Tympanic membrane, ear canal and external ear normal.      Nose: Congestion present.      Mouth/Throat:      Mouth: Mucous membranes are moist.      " Pharynx: Oropharynx is clear.   Eyes:      Extraocular Movements: Extraocular movements intact.      Conjunctiva/sclera: Conjunctivae normal.      Pupils: Pupils are equal, round, and reactive to light.   Cardiovascular:      Rate and Rhythm: Normal rate and regular rhythm.      Pulses: Normal pulses.      Heart sounds: Normal heart sounds.   Pulmonary:      Effort: Pulmonary effort is normal.      Breath sounds: Wheezing and rhonchi present.   Musculoskeletal:      Cervical back: Normal range of motion and neck supple. No rigidity or tenderness.   Lymphadenopathy:      Cervical: No cervical adenopathy.   Neurological:      Mental Status: She is alert.         Procedures    Point of Care Test & Imaging Results from this visit  No results found for this visit on 12/26/24.   No results found.    Diagnostic study results (if any) were reviewed by Kelley Hernandez MD.    Assessment/Plan   Allergies, medications, history, and pertinent labs/EKGs/Imaging reviewed by Kelley Hernandez MD.     Medical Decision Making      Orders and Diagnoses  There are no diagnoses linked to this encounter.    Medical Admin Record      Patient disposition: Home    Electronically signed by Kelley Hernandez MD  11:22 AM

## 2025-02-10 ENCOUNTER — OFFICE VISIT (OUTPATIENT)
Dept: URGENT CARE | Age: 35
End: 2025-02-10
Payer: COMMERCIAL

## 2025-02-10 VITALS
RESPIRATION RATE: 16 BRPM | HEIGHT: 65 IN | TEMPERATURE: 99.5 F | HEART RATE: 96 BPM | DIASTOLIC BLOOD PRESSURE: 84 MMHG | BODY MASS INDEX: 19.99 KG/M2 | OXYGEN SATURATION: 98 % | SYSTOLIC BLOOD PRESSURE: 140 MMHG | WEIGHT: 120 LBS

## 2025-02-10 DIAGNOSIS — J10.1 INFLUENZA A: ICD-10-CM

## 2025-02-10 DIAGNOSIS — J06.9 UPPER RESPIRATORY TRACT INFECTION, UNSPECIFIED TYPE: Primary | ICD-10-CM

## 2025-02-10 LAB
POC RAPID INFLUENZA A: POSITIVE
POC RAPID INFLUENZA B: NEGATIVE
POC SARS-COV-2 AG BINAX: NORMAL

## 2025-02-10 RX ORDER — BROMPHENIRAMINE MALEATE, PSEUDOEPHEDRINE HYDROCHLORIDE, AND DEXTROMETHORPHAN HYDROBROMIDE 2; 30; 10 MG/5ML; MG/5ML; MG/5ML
10 SYRUP ORAL 4 TIMES DAILY PRN
Qty: 240 ML | Refills: 0 | Status: SHIPPED | OUTPATIENT
Start: 2025-02-10 | End: 2025-02-15

## 2025-02-10 RX ORDER — IBUPROFEN 800 MG/1
800 TABLET ORAL EVERY 8 HOURS PRN
Qty: 40 TABLET | Refills: 0 | Status: SHIPPED | OUTPATIENT
Start: 2025-02-10

## 2025-02-10 ASSESSMENT — ENCOUNTER SYMPTOMS
FEVER: 1
COUGH: 1

## 2025-02-10 NOTE — LETTER
February 10, 2025     Patient: Mary De La Fuente   YOB: 1990   Date of Visit: 2/10/2025       To Whom It May Concern:    Mary De La Fuente was seen in my clinic on 2/10/2025 at 8:30 am. Please excuse Mary for her absence from work 02/10/25 through 02/12/25.    If you have any questions or concerns, please don't hesitate to call.         Sincerely,         Kelley Hernandez MD        CC: No Recipients

## 2025-02-10 NOTE — PROGRESS NOTES
"Subjective   Patient ID: Mary De La Fuente is a 35 y.o. female. She presents today with a chief complaint of Cough, Fever, Sinusitis, and Generalized Body Aches (3-4 days).    History of Present Illness  Subjective  Mary De La Fuente is a 35 y.o. female who presents for evaluation of symptoms of a URI. Symptoms include achiness, cough described as productive, fever, and nasal congestion. Onset of symptoms was 3 days ago and has been unchanged since that time. Treatment to date: Tylenol.      Assessment/Plan  influenza.    Discussed diagnosis and treatment of URI.  Suggested symptomatic OTC remedies.  Nasal saline spray for congestion.  Follow up as needed.        Cough  Associated symptoms include a fever.   Fever   Associated symptoms include coughing.   Sinusitis  Associated symptoms: cough and fever        Past Medical History  Allergies as of 02/10/2025    (No Known Allergies)       (Not in a hospital admission)       Past Medical History:   Diagnosis Date    Personal history of other diseases of the female genital tract 06/06/2019    History of abnormal uterine bleeding       Past Surgical History:   Procedure Laterality Date    OTHER SURGICAL HISTORY  11/01/2019    Laparoscopy    OTHER SURGICAL HISTORY  01/13/2022    Laparoscopic hysterectomy        reports that she has been smoking cigarettes. She has never used smokeless tobacco. She reports current alcohol use of about 1.0 standard drink of alcohol per week. She reports that she does not use drugs.    Review of Systems  Review of Systems   Constitutional:  Positive for fever.   Respiratory:  Positive for cough.                                   Objective    Vitals:    02/10/25 0837   BP: 140/84   Pulse: 96   Resp: 16   Temp: 37.5 °C (99.5 °F)   TempSrc: Oral   SpO2: 98%   Weight: 54.4 kg (120 lb)   Height: 1.651 m (5' 5\")     No LMP recorded. Patient has had a hysterectomy.    Physical Exam  Vitals and nursing note reviewed.   Constitutional:       General: She is " not in acute distress.     Appearance: Normal appearance. She is ill-appearing. She is not toxic-appearing.   HENT:      Right Ear: Tympanic membrane, ear canal and external ear normal.      Left Ear: Tympanic membrane, ear canal and external ear normal.      Nose: Congestion and rhinorrhea present.      Mouth/Throat:      Mouth: Mucous membranes are moist.      Pharynx: Oropharynx is clear.   Eyes:      Extraocular Movements: Extraocular movements intact.      Conjunctiva/sclera: Conjunctivae normal.      Pupils: Pupils are equal, round, and reactive to light.   Cardiovascular:      Rate and Rhythm: Normal rate and regular rhythm.      Pulses: Normal pulses.      Heart sounds: Normal heart sounds.   Pulmonary:      Effort: Pulmonary effort is normal.      Breath sounds: Normal breath sounds. No wheezing, rhonchi or rales.   Musculoskeletal:      Cervical back: Normal range of motion and neck supple. No rigidity or tenderness.   Lymphadenopathy:      Cervical: No cervical adenopathy.   Neurological:      Mental Status: She is alert.         Procedures    Point of Care Test & Imaging Results from this visit  No results found for this visit on 02/10/25.   No results found.    Diagnostic study results (if any) were reviewed by Kelley Hernandez MD.    Assessment/Plan   Allergies, medications, history, and pertinent labs/EKGs/Imaging reviewed by Kelley Hernandez MD.     Medical Decision Making      Orders and Diagnoses  There are no diagnoses linked to this encounter.    Medical Admin Record      Patient disposition: Home    Electronically signed by Kelley Hernandez MD  8:39 AM